# Patient Record
Sex: MALE | Race: WHITE | Employment: UNEMPLOYED | RURAL
[De-identification: names, ages, dates, MRNs, and addresses within clinical notes are randomized per-mention and may not be internally consistent; named-entity substitution may affect disease eponyms.]

---

## 2021-07-24 ENCOUNTER — APPOINTMENT (OUTPATIENT)
Dept: CT IMAGING | Facility: HOSPITAL | Age: 36
End: 2021-07-24
Payer: COMMERCIAL

## 2021-07-24 ENCOUNTER — HOSPITAL ENCOUNTER (EMERGENCY)
Facility: HOSPITAL | Age: 36
Discharge: ANOTHER HOSPITAL | End: 2021-07-25
Attending: EMERGENCY MEDICINE
Payer: COMMERCIAL

## 2021-07-24 ENCOUNTER — APPOINTMENT (OUTPATIENT)
Dept: GENERAL RADIOLOGY | Facility: HOSPITAL | Age: 36
End: 2021-07-24
Payer: COMMERCIAL

## 2021-07-24 VITALS
SYSTOLIC BLOOD PRESSURE: 120 MMHG | WEIGHT: 134 LBS | HEART RATE: 105 BPM | DIASTOLIC BLOOD PRESSURE: 84 MMHG | RESPIRATION RATE: 24 BRPM | OXYGEN SATURATION: 97 % | TEMPERATURE: 99.8 F | BODY MASS INDEX: 20.31 KG/M2 | HEIGHT: 68 IN

## 2021-07-24 DIAGNOSIS — D72.829 LEUKOCYTOSIS, UNSPECIFIED TYPE: ICD-10-CM

## 2021-07-24 DIAGNOSIS — F19.10 IV DRUG ABUSE (HCC): ICD-10-CM

## 2021-07-24 DIAGNOSIS — R07.89 CHEST WALL PAIN: ICD-10-CM

## 2021-07-24 DIAGNOSIS — R00.0 TACHYCARDIA: ICD-10-CM

## 2021-07-24 DIAGNOSIS — L03.113 CELLULITIS OF RIGHT ELBOW: ICD-10-CM

## 2021-07-24 DIAGNOSIS — I26.90 ACUTE SEPTIC PULMONARY EMBOLISM, UNSPECIFIED WHETHER ACUTE COR PULMONALE PRESENT (HCC): Primary | ICD-10-CM

## 2021-07-24 DIAGNOSIS — R06.00 DYSPNEA, UNSPECIFIED TYPE: ICD-10-CM

## 2021-07-24 DIAGNOSIS — R01.1 SYSTOLIC MURMUR: ICD-10-CM

## 2021-07-24 DIAGNOSIS — A41.9 SEPTICEMIA (HCC): ICD-10-CM

## 2021-07-24 LAB
A/G RATIO: 0.5 (ref 0.8–2)
ALBUMIN SERPL-MCNC: 2.2 G/DL (ref 3.4–4.8)
ALP BLD-CCNC: 191 U/L (ref 25–100)
ALT SERPL-CCNC: 46 U/L (ref 4–36)
ANION GAP SERPL CALCULATED.3IONS-SCNC: 12 MMOL/L (ref 3–16)
AST SERPL-CCNC: 72 U/L (ref 8–33)
BASOPHILS ABSOLUTE: 0.1 K/UL (ref 0–0.1)
BASOPHILS RELATIVE PERCENT: 0.4 %
BILIRUB SERPL-MCNC: 0.5 MG/DL (ref 0.3–1.2)
BUN BLDV-MCNC: 36 MG/DL (ref 6–20)
CALCIUM SERPL-MCNC: 8.2 MG/DL (ref 8.5–10.5)
CHLORIDE BLD-SCNC: 99 MMOL/L (ref 98–107)
CO2: 20 MMOL/L (ref 20–30)
CREAT SERPL-MCNC: 1.4 MG/DL (ref 0.4–1.2)
EOSINOPHILS ABSOLUTE: 0 K/UL (ref 0–0.4)
EOSINOPHILS RELATIVE PERCENT: 0.1 %
GFR AFRICAN AMERICAN: >59
GFR NON-AFRICAN AMERICAN: 58
GLOBULIN: 4.2 G/DL
GLUCOSE BLD-MCNC: 105 MG/DL (ref 74–106)
HCT VFR BLD CALC: 31.5 % (ref 40–54)
HEMOGLOBIN: 10.4 G/DL (ref 13–18)
IMMATURE GRANULOCYTES #: 0.3 K/UL
IMMATURE GRANULOCYTES %: 2 % (ref 0–5)
LACTIC ACID: 1.8 MMOL/L (ref 0.4–2)
LYMPHOCYTES ABSOLUTE: 1.2 K/UL (ref 1.5–4)
LYMPHOCYTES RELATIVE PERCENT: 8.4 %
MCH RBC QN AUTO: 27.1 PG (ref 27–32)
MCHC RBC AUTO-ENTMCNC: 33 G/DL (ref 31–35)
MCV RBC AUTO: 82 FL (ref 80–100)
MONOCYTES ABSOLUTE: 0.6 K/UL (ref 0.2–0.8)
MONOCYTES RELATIVE PERCENT: 4.4 %
NEUTROPHILS ABSOLUTE: 12 K/UL (ref 2–7.5)
NEUTROPHILS RELATIVE PERCENT: 84.7 %
PDW BLD-RTO: 13.7 % (ref 11–16)
PLATELET # BLD: 211 K/UL (ref 150–400)
PMV BLD AUTO: 12 FL (ref 6–10)
POTASSIUM REFLEX MAGNESIUM: 3.8 MMOL/L (ref 3.4–5.1)
RBC # BLD: 3.84 M/UL (ref 4.5–6)
SARS-COV-2, NAAT: NOT DETECTED
SODIUM BLD-SCNC: 131 MMOL/L (ref 136–145)
TOTAL PROTEIN: 6.4 G/DL (ref 6.4–8.3)
TROPONIN: <0.3 NG/ML
WBC # BLD: 14.2 K/UL (ref 4–11)

## 2021-07-24 PROCEDURE — 6360000002 HC RX W HCPCS: Performed by: EMERGENCY MEDICINE

## 2021-07-24 PROCEDURE — 74177 CT ABD & PELVIS W/CONTRAST: CPT

## 2021-07-24 PROCEDURE — 87635 SARS-COV-2 COVID-19 AMP PRB: CPT

## 2021-07-24 PROCEDURE — 83605 ASSAY OF LACTIC ACID: CPT

## 2021-07-24 PROCEDURE — 80053 COMPREHEN METABOLIC PANEL: CPT

## 2021-07-24 PROCEDURE — 87150 DNA/RNA AMPLIFIED PROBE: CPT

## 2021-07-24 PROCEDURE — 6360000004 HC RX CONTRAST MEDICATION: Performed by: EMERGENCY MEDICINE

## 2021-07-24 PROCEDURE — 71045 X-RAY EXAM CHEST 1 VIEW: CPT

## 2021-07-24 PROCEDURE — 36415 COLL VENOUS BLD VENIPUNCTURE: CPT

## 2021-07-24 PROCEDURE — 87186 SC STD MICRODIL/AGAR DIL: CPT

## 2021-07-24 PROCEDURE — 85025 COMPLETE CBC W/AUTO DIFF WBC: CPT

## 2021-07-24 PROCEDURE — 87040 BLOOD CULTURE FOR BACTERIA: CPT

## 2021-07-24 PROCEDURE — 84484 ASSAY OF TROPONIN QUANT: CPT

## 2021-07-24 PROCEDURE — 99283 EMERGENCY DEPT VISIT LOW MDM: CPT

## 2021-07-24 PROCEDURE — 71275 CT ANGIOGRAPHY CHEST: CPT

## 2021-07-24 PROCEDURE — 96365 THER/PROPH/DIAG IV INF INIT: CPT

## 2021-07-24 PROCEDURE — 99284 EMERGENCY DEPT VISIT MOD MDM: CPT

## 2021-07-24 PROCEDURE — 2580000003 HC RX 258: Performed by: EMERGENCY MEDICINE

## 2021-07-24 RX ORDER — 0.9 % SODIUM CHLORIDE 0.9 %
1000 INTRAVENOUS SOLUTION INTRAVENOUS ONCE
Status: COMPLETED | OUTPATIENT
Start: 2021-07-24 | End: 2021-07-25

## 2021-07-24 RX ADMIN — IOPAMIDOL 100 ML: 755 INJECTION, SOLUTION INTRAVENOUS at 22:15

## 2021-07-24 RX ADMIN — PIPERACILLIN AND TAZOBACTAM 4500 MG: 4; .5 INJECTION, POWDER, FOR SOLUTION INTRAVENOUS at 21:42

## 2021-07-24 RX ADMIN — SODIUM CHLORIDE 1000 ML: 9 INJECTION, SOLUTION INTRAVENOUS at 21:41

## 2021-07-24 ASSESSMENT — PAIN DESCRIPTION - PAIN TYPE: TYPE: ACUTE PAIN

## 2021-07-24 ASSESSMENT — PAIN SCALES - GENERAL: PAINLEVEL_OUTOF10: 10

## 2021-07-24 ASSESSMENT — PAIN DESCRIPTION - LOCATION: LOCATION: CHEST

## 2021-07-24 ASSESSMENT — PAIN DESCRIPTION - FREQUENCY: FREQUENCY: CONTINUOUS

## 2021-07-24 ASSESSMENT — PAIN DESCRIPTION - DESCRIPTORS: DESCRIPTORS: HEAVINESS

## 2021-07-24 ASSESSMENT — PAIN DESCRIPTION - ORIENTATION: ORIENTATION: LEFT

## 2021-07-25 PROCEDURE — 2580000003 HC RX 258: Performed by: EMERGENCY MEDICINE

## 2021-07-25 RX ORDER — 0.9 % SODIUM CHLORIDE 0.9 %
1000 INTRAVENOUS SOLUTION INTRAVENOUS ONCE
Status: COMPLETED | OUTPATIENT
Start: 2021-07-25 | End: 2021-07-25

## 2021-07-25 RX ADMIN — SODIUM CHLORIDE 1000 ML: 9 INJECTION, SOLUTION INTRAVENOUS at 00:22

## 2021-07-25 NOTE — ED NOTES
Report given to [facility] at this time.      Carlyon Denver, RN  07/25/21 9494

## 2021-07-25 NOTE — ED PROVIDER NOTES
[address] ENCOUNTER      Pt Name: Letty Leventhal  MRN: [id number]  YOB: 1985  Date of evaluation: 7/24/2021  Provider: Lam Zamorano MD    [address] [place] [address]       Chief Complaint   Patient presents with    Shortness of Breath    Chest Pain         HISTORY OF PRESENT ILLNESS  (Location/Symptom, Timing/Onset, Context/Setting, Quality, Duration, Modifying Factors, Severity.)   Letty Leventhal is a 37 y.o. male who presents to the emergency department senior living with multiple complaints. Patient is known inmate who is being treated by medical staff at the senior living secondary to concern of cellulitis of the right elbow. Patient was being treated with vancomycin 1 g IV for the last 2 days. Staff member state that patient had been complaining of nonproductive cough along with chest wall pain and shortness of breath. Staff states that patient's oxygen saturation decreased prior to arrival and was concerned about sepsis so he was sent to the emergency department for further evaluation. Patient states that he believes that his infection of his right arm, shortness of breath, chest wall pain and cough that is nonproductive as result of using recent IV drug use. Patient states that approximately 2 to 3 weeks prior he had been shooting up Suboxone and had been sharing the needle with others and after the other individual use the needle and that he used that he did not clean it or use a sterile needle. Patient's O2 saturation upon arrival was 97%. Patient's temperature on arrival is 99.8. Patient states he does not currently smoke. Staff states that his symptoms were getting progressively worse even though receiving IV vancomycin so he was sent to the hospital for further evaluation definitive care. Patient is resting comfortably in the room conversing in full sentences no acute distress      Nursing notes were reviewed.     REVIEW OFSYSTEMS    (2-9 systems for level 4, 10 or more for level 5)   ROS:  General: Intermittent fever and body aches  Cardiovascular: Chest wall pain with cough  Respiratory: Intermittent shortness of breath  Gastrointestinal:  No pain, no nausea, no vomiting, no diarrhea  Musculoskeletal:  No muscle pain, no joint pain. Right arm cellulitis  Skin: Right arm cellulitis  Neurologic:  No speech problems, no headache, no extremity weakness  Psychiatric:  No anxiety  Genitourinary:  No dysuria, no hematuria    Except as noted above the remainder of the review of systems was reviewed and negative. PAST MEDICAL HISTORY     Past Medical History:   Diagnosis Date    Hepatitis C          SURGICAL HISTORY     History reviewed. No pertinent surgical history. CURRENT MEDICATIONS       Previous Medications    No medications on file       ALLERGIES     Trileptal [oxcarbazepine]    FAMILY HISTORY     History reviewed. No pertinent family history. SOCIAL HISTORY       Social History     Socioeconomic History    Marital status: Unknown     Spouse name: None    Number of children: None    Years of education: None    Highest education level: None   Occupational History    None   Tobacco Use    Smoking status: Former Smoker    Smokeless tobacco: Never Used   Substance and Sexual Activity    Alcohol use: Not Currently    Drug use: None    Sexual activity: None   Other Topics Concern    None   Social History Narrative    None     Social Determinants of Health     Financial Resource Strain:     Difficulty of Paying Living Expenses:    Food Insecurity:     Worried About Running Out of Food in the Last Year:     Ran Out of Food in the Last Year:    Transportation Needs:     Lack of Transportation (Medical):      Lack of Transportation (Non-Medical):    Physical Activity:     Days of Exercise per Week:     Minutes of Exercise per Session:    Stress:     Feeling of Stress :    Social Connections:     Frequency of Communication with Friends and Family:     Frequency of Social Gatherings with Friends and Family:     Attends Muslim Services:     Active Member of Clubs or Organizations:     Attends Club or Organization Meetings:     Marital Status:    Intimate Partner Violence:     Fear of Current or Ex-Partner:     Emotionally Abused:     Physically Abused:     Sexually Abused:          PHYSICAL EXAM    (up to 7 for level 4, 8 or more for level 5)     ED Triage Vitals   BP Temp Temp src Pulse Resp SpO2 Height Weight   07/24/21 2045 -- -- 07/24/21 2030 -- 07/24/21 2030 -- --   117/82   117  97 %         Physical Exam  General :Patient is awake, alert, oriented, in no acute distress, nontoxic appearing  HEENT: Pupils are equally round and reactive to light, EOMI, conjunctivae clear. Oral mucosa is moist, no exudate. Uvula is midline  Neck: Neck is supple, full range of motion, trachea midline  Cardiac: Heart regular rate, rhythm, 1/6 systolic murmur, rubs, or gallops  Lungs: Decreased breath sounds bilateral bases with normal respiratory rate. Bilateral rhonchi  Chest wall: There is mild reproducible chest wall discomfort on palpation that recreates his symptoms of breath patient in  Abdomen: Abdomen is soft, nontender, nondistended. There is no firm or pulsatile masses, no rebound rigidity or guarding. Musculoskeletal: 5 out of 5 strength in all 4 extremities. No focal muscle deficits are appreciated  Right elbow= localized cellulitis right elbow. No fluctuance or drainage present  Neuro: Motor intact, sensory intact, level of consciousness is normal, cerebellar function is normal, reflexes are grossly normal. No evidence of incontinence or loss of bowel or bladder function, no saddle anesthesia noted   Dermatology: Right elbow cellulitis  Psych: Mentation is grossly normal, cognition is grossly normal. Affect is appropriate.       DIAGNOSTIC RESULTS     EKG: All EKG's are interpreted by the Emergency Department Physician who either signs or Co-signs this chart in the absenceof a cardiologist.    The EKG interpreted by me shows sinus tachycardia rate of 116 per EDMD    RADIOLOGY:   Non-plain film images such as CT, Ultrasound and MRI are read by the radiologist. Plain radiographic images are visualized and preliminarily interpreted by the emergency physician with the below findings:      ? Radiologist's Report Reviewed:  CT ABDOMEN PELVIS W IV CONTRAST Additional Contrast? None   Final Result      Chest:   1. Multiple scattered cavitary nodules compatible with septic emboli. 2. No filling defects within the pulmonary arteries to indicate pulmonary emboli. Abdomen/pelvis:   1. Moderate splenomegaly with multiple small splenic infarcts related to septic emboli. CTA CHEST W CONTRAST   Final Result      Chest:   1. Multiple scattered cavitary nodules compatible with septic emboli. 2. No filling defects within the pulmonary arteries to indicate pulmonary emboli. Abdomen/pelvis:   1. Moderate splenomegaly with multiple small splenic infarcts related to septic emboli. XR CHEST PORTABLE   Final Result      Bibasal airspace disease with a cavitary nodule at the left lung base. This is likely infectious/inflammatory process to include the possibility of septic emboli.             ED BEDSIDE ULTRASOUND:   Performed by ED Physician - none    LABS:    I have reviewed and interpreted all of the currently available lab results from this visit (ifapplicable):  Results for orders placed or performed during the hospital encounter of 07/24/21   COVID-19, Rapid   Result Value Ref Range    SARS-CoV-2, NAAT Not Detected Not Detected   CBC Auto Differential   Result Value Ref Range    WBC 14.2 (H) 4.0 - 11.0 K/uL    RBC 3.84 (L) 4.50 - 6.00 M/uL    Hemoglobin 10.4 (L) 13.0 - 18.0 g/dL    Hematocrit 31.5 (L) 40.0 - 54.0 %    MCV 82.0 80.0 - 100.0 fL    MCH 27.1 27.0 - 32.0 pg    MCHC 33.0 31.0 - 35.0 g/dL    RDW 13.7 11.0 - 16.0 %    Platelets 275 745 - 616 K/uL    MPV 12.0 (H) 6.0 - 10.0 fL    Neutrophils % 84.7 %    Immature Granulocytes % 2.0 0.0 - 5.0 %    Lymphocytes % 8.4 %    Monocytes % 4.4 %    Eosinophils % 0.1 %    Basophils % 0.4 %    Neutrophils Absolute 12.0 (H) 2.0 - 7.5 K/uL    Immature Granulocytes # 0.3 K/uL    Lymphocytes Absolute 1.2 (L) 1.5 - 4.0 K/uL    Monocytes Absolute 0.6 0.2 - 0.8 K/uL    Eosinophils Absolute 0.0 0.0 - 0.4 K/uL    Basophils Absolute 0.1 0.0 - 0.1 K/uL   Comprehensive Metabolic Panel w/ Reflex to MG   Result Value Ref Range    Sodium 131 (L) 136 - 145 mmol/L    Potassium reflex Magnesium 3.8 3.4 - 5.1 mmol/L    Chloride 99 98 - 107 mmol/L    CO2 20 20 - 30 mmol/L    Anion Gap 12 3 - 16    Glucose 105 74 - 106 mg/dL    BUN 36 (H) 6 - 20 mg/dL    CREATININE 1.4 (H) 0.4 - 1.2 mg/dL    GFR Non-African American 58 (L) >59    GFR African American >59 >59    Calcium 8.2 (L) 8.5 - 10.5 mg/dL    Total Protein 6.4 6.4 - 8.3 g/dL    Albumin 2.2 (L) 3.4 - 4.8 g/dL    Albumin/Globulin Ratio 0.5 (L) 0.8 - 2.0    Total Bilirubin 0.5 0.3 - 1.2 mg/dL    Alkaline Phosphatase 191 (H) 25 - 100 U/L    ALT 46 (H) 4 - 36 U/L    AST 72 (H) 8 - 33 U/L    Globulin 4.2 g/dL   Troponin   Result Value Ref Range    Troponin <0.30 <0.30 ng/mL   Lactic Acid, Plasma   Result Value Ref Range    Lactic Acid 1.8 0.4 - 2.0 mmol/L        All other labs were within normal range or not returned as of this dictation.     EMERGENCY DEPARTMENT COURSE and DIFFERENTIAL DIAGNOSIS/MDM:   Vitals:    Vitals:    07/24/21 2245 07/24/21 2300 07/24/21 2315 07/24/21 2330   BP: 115/80 114/78 120/83 127/86   Pulse: 109 109 110 105   Resp:    22   Temp:       TempSrc:       SpO2: 96% 96% 97% 97%   Weight:       Height:           MEDICATIONS ADMINISTERED IN ED:  Medications   piperacillin-tazobactam (ZOSYN) 4,500 mg in dextrose 5 % 100 mL IVPB (mini-bag) (0 mg Intravenous Stopped 7/24/21 2308)   0.9 % sodium chloride bolus (1,000 mLs Intravenous New Bag 7/24/21 2141)   iopamidol (ISOVUE-370) 76 % injection 100 mL (100 mLs Intravenous Given 7/24/21 1935)       Patient was placed examination room in which time after exam was performed EKG was obtained which showed sinus tachycardia rate of 116 per EDMD.  IV was obtained and labs were drawn. Chest x-ray was obtained which impression per radiology revealed bibasilar airspace disease with cavitary nodule at the left lung base concerning for infectious process including possibility of septic emboli. CTA of chest was obtained. Covid test was negative. Patient had already received 1 g of vancomycin IV today [facility] as documented by staff. Patient was given Zosyn 4.5 g IV. Patient was given 1 L IV normal saline bolus. Review of patient's labs revealed lactic acid of 1.8. Blood cultures x2 was obtained. Troponin was negative. Creatinine was 1.4. White count was 14.2 thousand. Glucose was 105. COVID-19 test was negative. CTA of the chest revealed multiple scattered cavitary nodules compatible with septic emboli. No pulmonary embolus noted. CT abdomen and pelvis revealed moderate splenomegaly with small splenic infarcts related to septic emboli. Results were reviewed with the patient and security guards present and the need to transfer the patient to a higher level of care secondary concerns of septic pulmonary emboli, sepsis, septic splenic emboli, need for cardiac echo, pulmonology. Infectious disease. Dr. Celso Rodriguez with [facility] was consulted by phone who graciously excepted the patient for transfer to [facility] emergency department. Plan was discussed with patient who is appreciative the care agrees with the plan above  The patient will follow-up with their PCP in 1-2 days for reevaluation. If the patient or family members have anyfurther concerns or any worsening symptoms they will return to the ED for reevaluation.          CONSULTS:Dr Celso Rodriguez with [facility] transfer center was consulted by phone who graciously excepted patient in transfer to [facility] emergency department for further evaluation definitive care      PROCEDURES:      CRITICAL CARE TIME    Total Critical Care time was 30 minutes, excluding separately reportable procedures. There was a high probability of clinically significant/life threatening deterioration in the patient's condition which required my urgent intervention secondary to concerns of sepsis, septic pulmonary emboli bilateral, septic splenic emboli, concerns of endocarditis and further deterioration. FINAL IMPRESSION      1. Acute septic pulmonary embolism, unspecified whether acute cor pulmonale present (HCC) Stable   2. Dyspnea, unspecified type Stable   3. Chest wall pain Stable   4. IV drug abuse ([facility]) Stable   5. Systolic murmur Stable   6. Cellulitis of right elbow Stable   7. Leukocytosis, unspecified type Stable   8. Septicemia ([facility]) Stable   9. Tachycardia Stable         DISPOSITION/PLAN   DISPOSITION Decision To Transfer 07/24/2021 11:31:01 PM      PATIENT REFERRED TO:  No follow-up provider specified. DISCHARGE MEDICATIONS:  New Prescriptions    No medications on file       Comment: Please note this report has been produced using speech recognition software and may contain errorsrelated to that system including errors in grammar, punctuation, and spelling, as well as words and phrases that may be inappropriate. If there are any questions or concerns please feel free to contact the dictating providerfor clarification.     Abdullahi Salgado MD  Attending Emergency Physician              Abdullahi Salgado MD  07/24/21 8812

## 2021-07-25 NOTE — ED NOTES
Report given to Ervin Baumann RN at [address] [place] [address].       Anna Eugene RN  07/24/21 2740

## 2021-07-25 NOTE — ED NOTES
Administered Isovue 370 per radiology department protocols via hep lock established in ER. Pt tolerated the exam without any obvious difficulties and was d/c'd back to ER with no apparent reaction to the contrast.  ER RN notified of contrast administration.

## 2021-07-25 NOTE — ED NOTES
Pt in [facility] at [facility]       Raman Fisher, LEXI  07/25/21 3327

## 2021-07-25 NOTE — ED TRIAGE NOTES
PT STATED HE WAS HAVING TROUBLE BREATHING, COULDNT GET UP OUT OF BED THEN WENT TO CHEST PAIN, ACHING AND FELT LIKE HE WAS SWELLING. NO SWELLING AT THIS TIME. /

## 2021-07-27 LAB — REPORT: NORMAL

## 2021-07-27 NOTE — ED NOTES
Reported positive blood culture of Staph Aureus to Rufino Pickens RN.       Dalton Mary RN  07/27/21 2952

## 2021-07-28 LAB
BLOOD CULTURE, ROUTINE: ABNORMAL
BLOOD CULTURE, ROUTINE: ABNORMAL
ORGANISM: ABNORMAL
ORGANISM: ABNORMAL

## 2021-07-29 LAB — CULTURE, BLOOD 2: NORMAL

## 2021-08-06 ENCOUNTER — HOSPITAL ENCOUNTER (EMERGENCY)
Facility: HOSPITAL | Age: 36
Discharge: HOME | End: 2021-08-07
Attending: FAMILY MEDICINE
Payer: COMMERCIAL

## 2021-08-06 ENCOUNTER — APPOINTMENT (OUTPATIENT)
Dept: GENERAL RADIOLOGY | Facility: HOSPITAL | Age: 36
End: 2021-08-06
Payer: COMMERCIAL

## 2021-08-06 DIAGNOSIS — R60.1 ANASARCA: Primary | ICD-10-CM

## 2021-08-06 LAB
A/G RATIO: 0.4 (ref 0.8–2)
ALBUMIN SERPL-MCNC: 2.1 G/DL (ref 3.4–4.8)
ALP BLD-CCNC: 72 U/L (ref 25–100)
ALT SERPL-CCNC: 13 U/L (ref 4–36)
ANION GAP SERPL CALCULATED.3IONS-SCNC: 9 MMOL/L (ref 3–16)
AST SERPL-CCNC: 17 U/L (ref 8–33)
BASOPHILS ABSOLUTE: 0.1 K/UL (ref 0–0.1)
BASOPHILS RELATIVE PERCENT: 0.9 %
BILIRUB SERPL-MCNC: 0.3 MG/DL (ref 0.3–1.2)
BILIRUBIN URINE: NEGATIVE
BLOOD, URINE: ABNORMAL
BUN BLDV-MCNC: 16 MG/DL (ref 6–20)
CALCIUM SERPL-MCNC: 8.4 MG/DL (ref 8.5–10.5)
CHLORIDE BLD-SCNC: 107 MMOL/L (ref 98–107)
CLARITY: CLEAR
CO2: 20 MMOL/L (ref 20–30)
COLOR: ABNORMAL
CREAT SERPL-MCNC: 1 MG/DL (ref 0.4–1.2)
EOSINOPHILS ABSOLUTE: 0 K/UL (ref 0–0.4)
EOSINOPHILS RELATIVE PERCENT: 0 %
EPITHELIAL CELLS, UA: NORMAL /HPF (ref 0–5)
GFR AFRICAN AMERICAN: >59
GFR NON-AFRICAN AMERICAN: >59
GLOBULIN: 5.1 G/DL
GLUCOSE BLD-MCNC: 98 MG/DL (ref 74–106)
GLUCOSE URINE: NEGATIVE MG/DL
HCT VFR BLD CALC: 26.3 % (ref 40–54)
HEMOGLOBIN: 8.3 G/DL (ref 13–18)
IMMATURE GRANULOCYTES #: 0.1 K/UL
IMMATURE GRANULOCYTES %: 0.5 % (ref 0–5)
KETONES, URINE: NEGATIVE MG/DL
LEUKOCYTE ESTERASE, URINE: NEGATIVE
LYMPHOCYTES ABSOLUTE: 1.7 K/UL (ref 1.5–4)
LYMPHOCYTES RELATIVE PERCENT: 13.4 %
MCH RBC QN AUTO: 26.9 PG (ref 27–32)
MCHC RBC AUTO-ENTMCNC: 31.6 G/DL (ref 31–35)
MCV RBC AUTO: 85.4 FL (ref 80–100)
MICROSCOPIC EXAMINATION: YES
MONOCYTES ABSOLUTE: 0.7 K/UL (ref 0.2–0.8)
MONOCYTES RELATIVE PERCENT: 5.6 %
NEUTROPHILS ABSOLUTE: 9.8 K/UL (ref 2–7.5)
NEUTROPHILS RELATIVE PERCENT: 79.6 %
NITRITE, URINE: NEGATIVE
PDW BLD-RTO: 15.5 % (ref 11–16)
PH UA: 6.5 (ref 5–8)
PLATELET # BLD: 990 K/UL (ref 150–400)
PMV BLD AUTO: 8.9 FL (ref 6–10)
POTASSIUM SERPL-SCNC: 3.8 MMOL/L (ref 3.4–5.1)
PRO-BNP: 2508 PG/ML (ref 0–900)
PROTEIN UA: NEGATIVE MG/DL
RBC # BLD: 3.08 M/UL (ref 4.5–6)
RBC UA: NORMAL /HPF (ref 0–4)
SODIUM BLD-SCNC: 136 MMOL/L (ref 136–145)
SPECIFIC GRAVITY UA: 1.02 (ref 1–1.03)
TOTAL PROTEIN: 7.2 G/DL (ref 6.4–8.3)
TROPONIN: <0.3 NG/ML
URINE REFLEX TO CULTURE: ABNORMAL
URINE TYPE: ABNORMAL
UROBILINOGEN, URINE: 0.2 E.U./DL
WBC # BLD: 12.3 K/UL (ref 4–11)
WBC UA: NORMAL /HPF (ref 0–5)

## 2021-08-06 PROCEDURE — 96376 TX/PRO/DX INJ SAME DRUG ADON: CPT

## 2021-08-06 PROCEDURE — 81001 URINALYSIS AUTO W/SCOPE: CPT

## 2021-08-06 PROCEDURE — 83880 ASSAY OF NATRIURETIC PEPTIDE: CPT

## 2021-08-06 PROCEDURE — 80053 COMPREHEN METABOLIC PANEL: CPT

## 2021-08-06 PROCEDURE — 84484 ASSAY OF TROPONIN QUANT: CPT

## 2021-08-06 PROCEDURE — 6360000002 HC RX W HCPCS: Performed by: FAMILY MEDICINE

## 2021-08-06 PROCEDURE — 71045 X-RAY EXAM CHEST 1 VIEW: CPT

## 2021-08-06 PROCEDURE — 36415 COLL VENOUS BLD VENIPUNCTURE: CPT

## 2021-08-06 PROCEDURE — 99283 EMERGENCY DEPT VISIT LOW MDM: CPT

## 2021-08-06 PROCEDURE — 85025 COMPLETE CBC W/AUTO DIFF WBC: CPT

## 2021-08-06 PROCEDURE — 96374 THER/PROPH/DIAG INJ IV PUSH: CPT

## 2021-08-06 RX ORDER — FUROSEMIDE 10 MG/ML
40 INJECTION INTRAMUSCULAR; INTRAVENOUS ONCE
Status: COMPLETED | OUTPATIENT
Start: 2021-08-06 | End: 2021-08-06

## 2021-08-06 RX ADMIN — FUROSEMIDE 40 MG: 10 INJECTION, SOLUTION INTRAMUSCULAR; INTRAVENOUS at 21:16

## 2021-08-06 RX ADMIN — FUROSEMIDE 40 MG: 10 INJECTION, SOLUTION INTRAMUSCULAR; INTRAVENOUS at 19:51

## 2021-08-06 ASSESSMENT — PAIN DESCRIPTION - PAIN TYPE: TYPE: ACUTE PAIN

## 2021-08-06 ASSESSMENT — PAIN SCALES - GENERAL: PAINLEVEL_OUTOF10: 10

## 2021-08-06 ASSESSMENT — PAIN DESCRIPTION - LOCATION: LOCATION: GENERALIZED

## 2021-08-06 NOTE — ED TRIAGE NOTES
Pt was discharged from [facility] today. Pt states that he has had an increase in swelling since being back to facility. Pt swollen in arms, legs, and groin.

## 2021-08-06 NOTE — ED PROVIDER NOTES
[address]  eMERGENCY dEPARTMENT eNCOUnter      Pt Name: Jarrett Keen  MRN: [id number]  [place] 1985  Date of evaluation: 8/6/2021  Provider: Dewayne Gayle DO    CHIEF COMPLAINT       Chief Complaint   Patient presents with    Leg Swelling    Arm Swelling    Groin Swelling         HISTORY OF PRESENT ILLNESS   (Location/Symptom, Timing/Onset, Context/Setting, Quality, Duration, Modifying Factors, Severity)  Note limiting factors. Jarrett Keen is a 37 y.o. male who presents to the emergency department for having swelling to his arms, legs, groin. Pt was recently released from [facility] today around 2 pm. He noted that he had the swelling before he left [facility] but they felt like it would be ok to D/C. He got back to [facility] and they noted that he was swollen so they sent him back out to be evaluated but wasn't taken back to [facility]. He came here for eval. Pt without any shortness of breath. Sat 97%. No cough, no fever. Still has PICC line and getting IV antibiotics. Pt with history of IVDA, using Suboxone in long-term, pt ended up with cellulitis of right arm and shortness of breath, having septic emboli on CT of chest here. Pt had echo, multiple procedures done at [facility]. They felt like he could be sent home with antibiotics, other medications and Lasix BID. Pt concerned cause he feels more swollen that what he was when he left. Nursing Notes were reviewed. REVIEW OF SYSTEMS    (2-9 systems for level 4, 10 or more forlevel 5)     Review of Systems   Constitutional:        Generalized swelling all over   Cardiovascular: Positive for leg swelling. All other systems reviewed and are negative. PAST MEDICAL HISTORY     Past Medical History:   Diagnosis Date    Hepatitis C          SURGICAL HISTORY     History reviewed. No pertinent surgical history.       CURRENT MEDICATIONS       Previous Medications    No medications on file       ALLERGIES Trileptal [oxcarbazepine]    FAMILY HISTORY     History reviewed. No pertinent family history. SOCIAL HISTORY       Social History     Socioeconomic History    Marital status: Unknown     Spouse name: None    Number of children: None    Years of education: None    Highest education level: None   Occupational History    None   Tobacco Use    Smoking status: Former Smoker    Smokeless tobacco: Never Used   Substance and Sexual Activity    Alcohol use: Not Currently    Drug use: None    Sexual activity: None   Other Topics Concern    None   Social History Narrative    None     Social Determinants of Health     Financial Resource Strain:     Difficulty of Paying Living Expenses:    Food Insecurity:     Worried About Running Out of Food in the Last Year:     Ran Out of Food in the Last Year:    Transportation Needs:     Lack of Transportation (Medical):  Lack of Transportation (Non-Medical):    Physical Activity:     Days of Exercise per Week:     Minutes of Exercise per Session:    Stress:     Feeling of Stress :    Social Connections:     Frequency of Communication with Friends and Family:     Frequency of Social Gatherings with Friends and Family:     Attends Taoism Services:     Active Member of Clubs or Organizations:     Attends Club or Organization Meetings:     Marital Status:    Intimate Partner Violence:     Fear of Current or Ex-Partner:     Emotionally Abused:     Physically Abused:     Sexually Abused:        SCREENINGS             PHYSICAL EXAM    (up to 7 for level 4, 8 or more for level 5)     ED Triage Vitals   BP Temp Temp Source Pulse Resp SpO2 Height Weight   08/06/21 1800 08/06/21 1803 08/06/21 1803 08/06/21 1800 08/06/21 1800 08/06/21 1800 08/06/21 1800 08/06/21 1800   (!) 143/113 99.2 °F (37.3 °C) Oral 101 18 98 % 5' 8\" (1.727 m) 134 lb (60.8 kg)       Physical Exam  Vitals and nursing note reviewed.    Constitutional:       General: He is not in acute redemonstrated.                ED BEDSIDE ULTRASOUND:   Performed by ED Physician - none    LABS:  Labs Reviewed   BRAIN NATRIURETIC PEPTIDE - Abnormal; Notable for the following components:       Result Value    Pro-BNP 2,508 (*)     All other components within normal limits    Narrative:     Performed at:  [address] [place] [address] Laboratory  [address] [place], [place] [address], [address]   Phone [phone]   COMPREHENSIVE METABOLIC PANEL - Abnormal; Notable for the following components:    Calcium 8.4 (*)     Albumin 2.1 (*)     Albumin/Globulin Ratio 0.4 (*)     All other components within normal limits    Narrative:     Performed at:  [address] [place] [address] Laboratory  [address] [place], [place] [address], [address]   Phone [phone]   CBC WITH AUTO DIFFERENTIAL - Abnormal; Notable for the following components:    WBC 12.3 (*)     RBC 3.08 (*)     Hemoglobin 8.3 (*)     Hematocrit 26.3 (*)     MCH 26.9 (*)     Platelets 924 (*)     Neutrophils Absolute 9.8 (*)     All other components within normal limits    Narrative:     Performed at:  [address] [place] [address] Laboratory  [address] [place], [place] [address], [address]   Phone [phone]   URINE  [address] - Abnormal; Notable for the following components:    Blood, Urine TRACE-INTACT (*)     All other components within normal limits    Narrative:     Performed at:  [address] [place] [address] Laboratory  [address] [place], [place] [address], [address]   Phone [phone]   TROPONIN    Narrative:     Performed at:  [address] [place] [address] Laboratory  [address] [place], [place] [address], [address]   Phone [phone]   MICROSCOPIC URINALYSIS    Narrative:     Performed at:  [address] [place] [address] Laboratory  [address] [place], [place] [address], [address]   Phone [phone]       All other labs were within normal range or not returned as of this dictation. EMERGENCY DEPARTMENT COURSE and DIFFERENTIAL DIAGNOSIS/MDM:   Vitals:    Vitals:    08/06/21 2130 08/06/21 2145 08/06/21 2200 08/06/21 2215   BP: (!) 143/105 (!) 143/106 (!) 146/103 (!) 152/100   Pulse:       Resp:       Temp:       TempSrc:       SpO2:       Weight:       Height:               CRITICAL CARE TIME   Total Critical Care time was 0 minutes, excluding separatelyreportable procedures. There was a high probability ofclinically significant/life threatening deterioration in the patient's condition which required my urgent intervention. CONSULTS:  Kevin Meier and spoke with Dr. Kei Brown who didn't feel based on review of charting,m today's discharge that he would need readmitted  Called local Dr. Alisson Rodney and discussed case and he felt the anasarca is normal and can last up to 6 weeks. Pt would be best served to go back to senior living and continue lasix but may go up to 80 mg in morning and 40 mg at night and recheck renal function in 4 days    PROCEDURES:  None    PROGRESS NOTES:    Reviewed his recent ER visit and all labs. Looks like the patient had endocarditis, multiple procedures, getting IV antibiotics. Pt does have some scrotal edema and penile edema as well as generalized edema. No CHF symptoms, SOA, chest pain, no fever. Will try to help diurese the patient while here and call to discuss case with [facility]. Gave IV lasix 40  Mg and then gave another 40 mg. Pt diuresed about 3 jugs full of ER urination measurement of 2,500 cc total so far. Discussed with pt and then called the nurse there stating that he will be sent back under condition of changing medications of Lasix and monitoring of patient's swelling and his respiratory status and temperature. Pt will need BNP and BMP checked in 4 days    FINAL IMPRESSION      1. Anasarca New Problem         DISPOSITION/PLAN   DISPOSITION Decision To Discharge 08/07/2021 12:37:03 AM      PATIENT REFERRED TO:    Pt to return to [address] [place] [address].  Need to continue to monitor his swelling, his urinary output daily. Need to continue with IV antibiotics and continue with his Lasix but increase morning dose to 80 mg and 40 mg at night. Pt to return if fever develops or SOA          DISCHARGE MEDICATIONS:  New Prescriptions    FUROSEMIDE (LASIX) 40 MG TABLET    Take 2 tabs on morning and 1 tab at night for next 4-5 days.        (Please note that portions of this note were completed with a voice recognition program.  Efforts were made to edit the dictations but occasionallywords are mis-transcribed.)    Haley Hunt DO (electronically signed)  Attending Emergency Physician          Haley Hunt DO  08/07/21 8964

## 2021-08-07 VITALS
TEMPERATURE: 99.2 F | BODY MASS INDEX: 20.31 KG/M2 | HEART RATE: 102 BPM | HEIGHT: 68 IN | RESPIRATION RATE: 18 BRPM | OXYGEN SATURATION: 98 % | SYSTOLIC BLOOD PRESSURE: 138 MMHG | DIASTOLIC BLOOD PRESSURE: 101 MMHG | WEIGHT: 134 LBS

## 2021-08-07 RX ORDER — FUROSEMIDE 40 MG/1
TABLET ORAL
Qty: 15 TABLET | Refills: 0 | Status: SHIPPED | OUTPATIENT
Start: 2021-08-07

## 2021-08-08 ENCOUNTER — HOSPITAL ENCOUNTER (EMERGENCY)
Facility: HOSPITAL | Age: 36
Discharge: ANOTHER HOSPITAL | End: 2021-08-08
Attending: FAMILY MEDICINE
Payer: COMMERCIAL

## 2021-08-08 ENCOUNTER — APPOINTMENT (OUTPATIENT)
Dept: CT IMAGING | Facility: HOSPITAL | Age: 36
End: 2021-08-08
Payer: COMMERCIAL

## 2021-08-08 VITALS
HEIGHT: 68 IN | HEART RATE: 82 BPM | TEMPERATURE: 98.1 F | BODY MASS INDEX: 20.31 KG/M2 | RESPIRATION RATE: 14 BRPM | OXYGEN SATURATION: 95 % | WEIGHT: 134 LBS | SYSTOLIC BLOOD PRESSURE: 133 MMHG | DIASTOLIC BLOOD PRESSURE: 110 MMHG

## 2021-08-08 DIAGNOSIS — R10.9 ABDOMINAL PAIN, UNSPECIFIED ABDOMINAL LOCATION: ICD-10-CM

## 2021-08-08 DIAGNOSIS — R60.1 ANASARCA: ICD-10-CM

## 2021-08-08 DIAGNOSIS — K92.2 ACUTE LOWER GI BLEEDING: Primary | ICD-10-CM

## 2021-08-08 DIAGNOSIS — D64.9 ANEMIA, UNSPECIFIED TYPE: ICD-10-CM

## 2021-08-08 LAB
ABO/RH: NORMAL
ANION GAP SERPL CALCULATED.3IONS-SCNC: 9 MMOL/L (ref 3–16)
ANTIBODY SCREEN: NORMAL
BASOPHILS ABSOLUTE: 0.1 K/UL (ref 0–0.1)
BASOPHILS RELATIVE PERCENT: 1.8 %
BLOOD BANK DISPENSE STATUS: NORMAL
BLOOD BANK PRODUCT CODE: NORMAL
BPU ID: NORMAL
BUN BLDV-MCNC: 15 MG/DL (ref 6–20)
CALCIUM SERPL-MCNC: 8.1 MG/DL (ref 8.5–10.5)
CHLORIDE BLD-SCNC: 105 MMOL/L (ref 98–107)
CO2: 22 MMOL/L (ref 20–30)
COMPONENT: NORMAL
CREAT SERPL-MCNC: 1.2 MG/DL (ref 0.4–1.2)
DONOR TYPE/RH: NORMAL
EOSINOPHILS ABSOLUTE: 0.2 K/UL (ref 0–0.4)
EOSINOPHILS RELATIVE PERCENT: 2.7 %
GFR AFRICAN AMERICAN: >59
GFR NON-AFRICAN AMERICAN: >59
GLUCOSE BLD-MCNC: 91 MG/DL (ref 74–106)
HCT VFR BLD CALC: 24.1 % (ref 40–54)
HEMOGLOBIN: 7.8 G/DL (ref 13–18)
HOLLISTER NO: NORMAL
IMMATURE GRANULOCYTES #: 0 K/UL
IMMATURE GRANULOCYTES %: 0.4 % (ref 0–5)
LYMPHOCYTES ABSOLUTE: 1.8 K/UL (ref 1.5–4)
LYMPHOCYTES RELATIVE PERCENT: 25.4 %
MAGNESIUM: 1.8 MG/DL (ref 1.7–2.4)
MCH RBC QN AUTO: 27.5 PG (ref 27–32)
MCHC RBC AUTO-ENTMCNC: 32.4 G/DL (ref 31–35)
MCV RBC AUTO: 84.9 FL (ref 80–100)
MONOCYTES ABSOLUTE: 0.5 K/UL (ref 0.2–0.8)
MONOCYTES RELATIVE PERCENT: 6.9 %
NEUTROPHILS ABSOLUTE: 4.5 K/UL (ref 2–7.5)
NEUTROPHILS RELATIVE PERCENT: 62.8 %
OCCULT BLOOD DIAGNOSTIC: ABNORMAL
PDW BLD-RTO: 15.4 % (ref 11–16)
PLATELET # BLD: 795 K/UL (ref 150–400)
PMV BLD AUTO: 8.7 FL (ref 6–10)
POTASSIUM REFLEX MAGNESIUM: 2.8 MMOL/L (ref 3.4–5.1)
PRO-BNP: 2809 PG/ML (ref 0–900)
RBC # BLD: 2.84 M/UL (ref 4.5–6)
SODIUM BLD-SCNC: 136 MMOL/L (ref 136–145)
WBC # BLD: 7.1 K/UL (ref 4–11)

## 2021-08-08 PROCEDURE — 36430 TRANSFUSION BLD/BLD COMPNT: CPT

## 2021-08-08 PROCEDURE — 86850 RBC ANTIBODY SCREEN: CPT

## 2021-08-08 PROCEDURE — 6360000002 HC RX W HCPCS: Performed by: FAMILY MEDICINE

## 2021-08-08 PROCEDURE — P9016 RBC LEUKOCYTES REDUCED: HCPCS

## 2021-08-08 PROCEDURE — 6360000004 HC RX CONTRAST MEDICATION: Performed by: FAMILY MEDICINE

## 2021-08-08 PROCEDURE — 6360000002 HC RX W HCPCS

## 2021-08-08 PROCEDURE — 74177 CT ABD & PELVIS W/CONTRAST: CPT

## 2021-08-08 PROCEDURE — 86901 BLOOD TYPING SEROLOGIC RH(D): CPT

## 2021-08-08 PROCEDURE — 83880 ASSAY OF NATRIURETIC PEPTIDE: CPT

## 2021-08-08 PROCEDURE — 86900 BLOOD TYPING SEROLOGIC ABO: CPT

## 2021-08-08 PROCEDURE — 86920 COMPATIBILITY TEST SPIN: CPT

## 2021-08-08 PROCEDURE — 85025 COMPLETE CBC W/AUTO DIFF WBC: CPT

## 2021-08-08 PROCEDURE — 80048 BASIC METABOLIC PNL TOTAL CA: CPT

## 2021-08-08 PROCEDURE — 6370000000 HC RX 637 (ALT 250 FOR IP)

## 2021-08-08 PROCEDURE — G0328 FECAL BLOOD SCRN IMMUNOASSAY: HCPCS

## 2021-08-08 PROCEDURE — 83735 ASSAY OF MAGNESIUM: CPT

## 2021-08-08 PROCEDURE — 36415 COLL VENOUS BLD VENIPUNCTURE: CPT

## 2021-08-08 PROCEDURE — C9113 INJ PANTOPRAZOLE SODIUM, VIA: HCPCS

## 2021-08-08 RX ORDER — MORPHINE SULFATE 4 MG/ML
4 INJECTION, SOLUTION INTRAMUSCULAR; INTRAVENOUS ONCE
Status: DISCONTINUED | OUTPATIENT
Start: 2021-08-08 | End: 2021-08-08 | Stop reason: HOSPADM

## 2021-08-08 RX ORDER — SODIUM CHLORIDE 9 MG/ML
10 INJECTION INTRAVENOUS DAILY
Status: DISCONTINUED | OUTPATIENT
Start: 2021-08-08 | End: 2021-08-08 | Stop reason: HOSPADM

## 2021-08-08 RX ORDER — SODIUM CHLORIDE 9 MG/ML
INJECTION, SOLUTION INTRAVENOUS PRN
Status: DISCONTINUED | OUTPATIENT
Start: 2021-08-08 | End: 2021-08-08 | Stop reason: HOSPADM

## 2021-08-08 RX ORDER — DIPHENHYDRAMINE HCL 25 MG
CAPSULE ORAL
Status: COMPLETED
Start: 2021-08-08 | End: 2021-08-08

## 2021-08-08 RX ORDER — PANTOPRAZOLE SODIUM 40 MG/10ML
40 INJECTION, POWDER, LYOPHILIZED, FOR SOLUTION INTRAVENOUS DAILY
Status: DISCONTINUED | OUTPATIENT
Start: 2021-08-08 | End: 2021-08-08 | Stop reason: HOSPADM

## 2021-08-08 RX ORDER — DIPHENHYDRAMINE HCL 25 MG
25 CAPSULE ORAL ONCE
Status: COMPLETED | OUTPATIENT
Start: 2021-08-08 | End: 2021-08-08

## 2021-08-08 RX ORDER — MORPHINE SULFATE 4 MG/ML
4 INJECTION, SOLUTION INTRAMUSCULAR; INTRAVENOUS ONCE
Status: COMPLETED | OUTPATIENT
Start: 2021-08-08 | End: 2021-08-08

## 2021-08-08 RX ORDER — POTASSIUM CHLORIDE 7.45 MG/ML
10 INJECTION INTRAVENOUS ONCE
Status: COMPLETED | OUTPATIENT
Start: 2021-08-08 | End: 2021-08-08

## 2021-08-08 RX ORDER — ACETAMINOPHEN 325 MG/1
TABLET ORAL
Status: COMPLETED
Start: 2021-08-08 | End: 2021-08-08

## 2021-08-08 RX ORDER — MAGNESIUM SULFATE IN WATER 40 MG/ML
2000 INJECTION, SOLUTION INTRAVENOUS ONCE
Status: DISCONTINUED | OUTPATIENT
Start: 2021-08-08 | End: 2021-08-08

## 2021-08-08 RX ORDER — PANTOPRAZOLE SODIUM 40 MG/10ML
INJECTION, POWDER, LYOPHILIZED, FOR SOLUTION INTRAVENOUS
Status: COMPLETED
Start: 2021-08-08 | End: 2021-08-08

## 2021-08-08 RX ORDER — ONDANSETRON 2 MG/ML
4 INJECTION INTRAMUSCULAR; INTRAVENOUS ONCE
Status: COMPLETED | OUTPATIENT
Start: 2021-08-08 | End: 2021-08-08

## 2021-08-08 RX ORDER — ACETAMINOPHEN 325 MG/1
650 TABLET ORAL ONCE
Status: COMPLETED | OUTPATIENT
Start: 2021-08-08 | End: 2021-08-08

## 2021-08-08 RX ORDER — POTASSIUM CHLORIDE 7.45 MG/ML
INJECTION INTRAVENOUS
Status: COMPLETED
Start: 2021-08-08 | End: 2021-08-08

## 2021-08-08 RX ORDER — POTASSIUM CHLORIDE 20 MEQ/1
40 TABLET, EXTENDED RELEASE ORAL ONCE
Status: DISCONTINUED | OUTPATIENT
Start: 2021-08-08 | End: 2021-08-08

## 2021-08-08 RX ADMIN — Medication 25 MG: at 07:11

## 2021-08-08 RX ADMIN — PANTOPRAZOLE SODIUM 40 MG: 40 INJECTION, POWDER, LYOPHILIZED, FOR SOLUTION INTRAVENOUS at 04:12

## 2021-08-08 RX ADMIN — ACETAMINOPHEN 650 MG: 325 TABLET, FILM COATED ORAL at 07:11

## 2021-08-08 RX ADMIN — POTASSIUM CHLORIDE 10 MEQ: 7.45 INJECTION INTRAVENOUS at 05:31

## 2021-08-08 RX ADMIN — IOPAMIDOL 100 ML: 755 INJECTION, SOLUTION INTRAVENOUS at 04:30

## 2021-08-08 RX ADMIN — DIPHENHYDRAMINE HYDROCHLORIDE 25 MG: 25 CAPSULE ORAL at 07:11

## 2021-08-08 RX ADMIN — ACETAMINOPHEN 650 MG: 325 TABLET ORAL at 07:11

## 2021-08-08 RX ADMIN — ONDANSETRON 4 MG: 2 INJECTION INTRAMUSCULAR; INTRAVENOUS at 01:52

## 2021-08-08 RX ADMIN — MORPHINE SULFATE 4 MG: 4 INJECTION, SOLUTION INTRAMUSCULAR; INTRAVENOUS at 01:52

## 2021-08-08 RX ADMIN — POTASSIUM CHLORIDE 10 MEQ: 10 INJECTION, SOLUTION INTRAVENOUS at 05:31

## 2021-08-08 ASSESSMENT — ENCOUNTER SYMPTOMS
BLOOD IN STOOL: 1
ABDOMINAL PAIN: 1
COLOR CHANGE: 1

## 2021-08-08 ASSESSMENT — PAIN SCALES - GENERAL
PAINLEVEL_OUTOF10: 10

## 2021-08-08 ASSESSMENT — PAIN DESCRIPTION - LOCATION: LOCATION: BACK;LEG;FOOT

## 2021-08-08 ASSESSMENT — PAIN DESCRIPTION - PAIN TYPE: TYPE: ACUTE PAIN

## 2021-08-08 NOTE — FLOWSHEET NOTE
08/08/21 0731   Vitals   Temp 97.9 °F (36.6 °C)   Temp Source Oral   Pulse 91   Resp 16   SpO2 99 %     Patient tolerating infusion well. No s/s of adverse reaction noted.

## 2021-08-08 NOTE — ED TRIAGE NOTES
Pt here via [facility] with guards at bedside. Pt c/o swelling over body, leg and feet pain and low back pain.  Pt recently dx with endocarditis and was at ed last dater for same complaint

## 2021-08-08 NOTE — ED NOTES
Pt resting quietly with eyes closed, morphine not given at this time.       Yanet Sanchez RN  08/08/21 0620

## 2021-08-08 NOTE — ED NOTES
Pt has removed bp cuff and pulse ox. Pt alert and oriented. Pale and warm. Pt has [id number] ml pale yellow urine.       Marianne Erickson RN  08/08/21 7135

## 2021-08-08 NOTE — ED NOTES
Patient report to [facility] at this time.      Ramy Zheng RN  08/08/21 4760

## 2021-08-08 NOTE — ED NOTES
Patient en route to [facility] / [facility] with [facility].      Christiano Hand RN  08/08/21 5341

## 2021-08-08 NOTE — ED NOTES
pts potassium paused, picc flushed. Then flushed, pulled and wasted 10 ml. Labs collected for type and cross.  Pt kelley well and ask for drink which was provided     Tj Milan RN  08/08/21 2431

## 2021-08-08 NOTE — ED PROVIDER NOTES
[address]  eMERGENCY dEPARTMENT eNCOUnter      Pt Name: Eduardo Rock  MRN: [id number]  [place] 1985  Date of evaluation: 8/7/2021  Provider: Carmine Yanes DO    CHIEF COMPLAINT       Chief Complaint   Patient presents with    Abdominal Pain    Leg Swelling    Rectal Bleeding         HISTORY OF PRESENT ILLNESS   (Location/Symptom, Timing/Onset, Context/Setting, Quality, Duration, Modifying Factors, Severity)  Note limiting factors. Eduardo Rock is a 37 y.o. male who presents to the emergency department for having continued swelling, having some generalized abdominal discomfort, and having rectal bleeding. Pt with recent history of having bacterial endocarditis, septic emboli to pulmonary and splenic symptom. Pt while there received AngioVac, had PICC line placed and given Nafcillin. Pt states that he continued to have swelling while there and they said that it was too much fluids he was getting. Pt without history of CHF, isn't having any shortness of breath, no chest pain, no cough, no diarrhea. No urinary symptoms. Pt isn't on any blood thinners but said they were giving him some shots in his abdomen. Pt complaining of pain all over his body, lopez to his back, buttocks, legs and feet. Pt without current fever. No current nausea or vomiting. No hematemesis. No headache. No dizziness or near syncope. Pt does feel weak all over and has been having to get up in his medical cell with a walker. Pt has been urinating well and taking his lasix 80 mg this morning and 40 mg tonight before arrival. Pt without history of GI bleeding. No recent rectal bleeding. No history of PUD, no varices history. Pt said that he had 2 bowel movements yesterday with blood with stool, tonight before arrival he had \"pure blood that came out\". No history of bleeding hemorrhoids. Nursing Notes were reviewed.     REVIEW OF SYSTEMS    (2-9 systems for level 4, 10 or more forlevel 5)     Review of Systems   Constitutional: Positive for activity change and fatigue. Gastrointestinal: Positive for abdominal pain and blood in stool. Musculoskeletal: Positive for arthralgias. Skin: Positive for color change. All other systems reviewed and are negative. PAST MEDICAL HISTORY     Past Medical History:   Diagnosis Date    Endocarditis     Hepatitis C          SURGICAL HISTORY     History reviewed. No pertinent surgical history. CURRENT MEDICATIONS       Previous Medications    FUROSEMIDE (LASIX) 40 MG TABLET    Take 2 tabs on morning and 1 tab at night for next 4-5 days. ALLERGIES     Trileptal [oxcarbazepine]    FAMILY HISTORY     History reviewed. No pertinent family history. SOCIAL HISTORY       Social History     Socioeconomic History    Marital status: Unknown     Spouse name: None    Number of children: None    Years of education: None    Highest education level: None   Occupational History    None   Tobacco Use    Smoking status: Former Smoker    Smokeless tobacco: Never Used   Substance and Sexual Activity    Alcohol use: Not Currently    Drug use: None    Sexual activity: None   Other Topics Concern    None   Social History Narrative    None     Social Determinants of Health     Financial Resource Strain:     Difficulty of Paying Living Expenses:    Food Insecurity:     Worried About Running Out of Food in the Last Year:     Ran Out of Food in the Last Year:    Transportation Needs:     Lack of Transportation (Medical):      Lack of Transportation (Non-Medical):    Physical Activity:     Days of Exercise per Week:     Minutes of Exercise per Session:    Stress:     Feeling of Stress :    Social Connections:     Frequency of Communication with Friends and Family:     Frequency of Social Gatherings with Friends and Family:     Attends [employer] Services:     Active Member of Clubs or Organizations:     Attends Club or Organization Meetings:    [facility] Marital Status:    Intimate Partner Violence:     Fear of Current or Ex-Partner:     Emotionally Abused:     Physically Abused:     Sexually Abused:        SCREENINGS             PHYSICAL EXAM    (up to 7 for level 4, 8 or more for level 5)     ED Triage Vitals [08/08/21 0017]   BP Temp Temp Source Pulse Resp SpO2 Height Weight   (!) 136/105 98.2 °F (36.8 °C) Oral 98 18 98 % 5' 8\" (1.727 m) 134 lb (60.8 kg)       Physical Exam  Vitals and nursing note reviewed. Constitutional:       General: He is not in acute distress. Appearance: He is well-developed. He is ill-appearing. HENT:      Head: Normocephalic. Eyes:      Extraocular Movements: Extraocular movements intact. Pupils: Pupils are equal, round, and reactive to light. Comments: Pale conjunctiva bilaterally   Cardiovascular:      Rate and Rhythm: Normal rate and regular rhythm. Heart sounds: Normal heart sounds. No murmur heard. Pulmonary:      Effort: Pulmonary effort is normal.      Comments: Mild bilateral posterior basilar rales R>L  Abdominal:      General: Bowel sounds are normal.      Palpations: Abdomen is soft. Tenderness: There is generalized abdominal tenderness. Genitourinary:     Rectum: Guaiac result positive. Comments: Pt with marked edema to scrotum and penis  Skin:     General: Skin is warm and dry. Coloration: Skin is pale. Comments: Pt has +3 pitting edema from lower extremities up to just below umbilicus. Pt's arm edema + 2; Pt does have anasarca noted but seems to be improved at this time from yesterday   Neurological:      Mental Status: He is alert and oriented to person, place, and time. Psychiatric:         Mood and Affect: Mood is depressed.          Behavior: Behavior normal.         DIAGNOSTIC RESULTS     EKG: All EKG's are interpreted by the Emergency Department Physician who either signs or Co-signs this chart in the absence of a cardiologist.    None    RADIOLOGY:   Non-plain film images such as CT, Ultrasound and MRI are read by the radiologist. Plain radiographic images are visualized andpreliminarily interpreted by the emergency physician with the below findings:    CT abd/pelvis w/IV contrast - See Below    Interpretationper the Radiologist below, if available at the time of this note:    CT ABDOMEN PELVIS W IV CONTRAST Additional Contrast? None    (Results Pending)         ED BEDSIDE ULTRASOUND:   Performed by ED Physician - none    LABS:  Labs Reviewed   CBC WITH AUTO DIFFERENTIAL - Abnormal; Notable for the following components:       Result Value    RBC 2.84 (*)     Hemoglobin 7.8 (*)     Hematocrit 24.1 (*)     Platelets 352 (*)     All other components within normal limits    Narrative:     Performed at:  [address] [place] [address] Laboratory  [address] [place] [address],  [place], [place] [address]   Phone [phone]   BASIC METABOLIC PANEL W/ REFLEX TO MG FOR LOW K - Abnormal; Notable for the following components:    Potassium reflex Magnesium 2.8 (*)     Calcium 8.1 (*)     All other components within normal limits    Narrative:     Godwin Lopez tel. [phone],  Chemistry results called to and read back by Judy Holland, 08/08/2021  02:31, by [facility]  Performed at:  [address] [place] [address] Laboratory  [address] [place] [address]  [place], [place] [address]   Phone [phone]   BRAIN NATRIURETIC PEPTIDE - Abnormal; Notable for the following components:    Pro-BNP 2,809 (*)     All other components within normal limits    Narrative:     Performed at:  [address] [place] [address] Laboratory  [address] [place] [address],  [place], [place] [address] [address]   Phone [phone]   BLOOD OCCULT STOOL DIAGNOSTIC - Abnormal; Notable for the following components:    Occult Blood Diagnostic   (*)     Value: Result: Negative  Normal range: Negative  Result: POSITIVE  Normal range: Negative      All other components within normal limits    Narrative:     ORDER#: [id number]                          ORDERED BY: Amadou Douglas  SOURCE: Stool                              COLLECTED:  08/08/21 03:00  ANTIBIOTICS AT [facility]A.:                      RECEIVED :  08/08/21 03:07  Performed at:  [address] [place] [address] and [facility]  [address] [place] [address],  [place], [place] [address] [address]   Phone [phone]   MAGNESIUM    Narrative:     Tara Reina tel. [phone],  Chemistry results called to and read back by Corbin Tineo, 08/08/2021  02:31, by [facility]  Performed at:  [address] [place] [address] and [address],  [place], [place] [address] [address]   Phone [phone]   TYPE AND SCREEN   PREPARE RBC (CROSSMATCH)       All other labs were within normal range or not returned as of this dictation. EMERGENCY DEPARTMENT COURSE and DIFFERENTIAL DIAGNOSIS/MDM:   Vitals:    Vitals:    08/08/21 0215 08/08/21 0230 08/08/21 0245 08/08/21 0300   BP: (!) 143/110 (!) 143/108 (!) 143/114 (!) 136/99   Pulse:       Resp:       Temp:       TempSrc:       SpO2:       Weight:       Height:               CRITICAL CARE TIME   Total Critical Care time was 0 minutes, excluding separatelyreportable procedures. There was a high probability ofclinically significant/life threatening deterioration in the patient's condition which required my urgent intervention. CONSULTS:  Harris Diaz to speak with Dr. Kelsi Ly at transfer line. Discussed his history. Recent anasarca that I feel has somewhat improved. Pt is having bright red blood in rectum and with exam concerning for lower GI bleed; He put him on waiting list and recommended CT; I told him I was treating his potassium, Hgb dropped from 8.3 to 7.8. He then called back and stated that he had a bed for patient and that if we could start blood it would help. Still awaiting CT results.  Gave IV Protonix for any upper GI bleed problems     PROCEDURES:  None    PROGRESS NOTES:    Will obtain BMP, BNP, CBC to assess Hgb, kidney function, and fluid overload. Will get rectal exam/hemoccult to assess for GI bleed; Ordered IV potassium; Pt with blood in rectal vault without stool, No gross hemorrhaging of blood from anus. Pt with Hgb dropping to 7.8 will type and cross for 1 unit blood since he is pale, symptomatic and could have GI bleed. CT abd/pelvis pending. Will transfer to [facility] for further assessment and possible EGD/Colonoscopy    FINAL IMPRESSION      1. Acute lower GI bleeding New Problem   2. Abdominal pain, unspecified abdominal location New Problem   3. Anasarca New Problem   4. Anemia, unspecified type New Problem         DISPOSITION/PLAN   DISPOSITION Decision To Transfer 08/08/2021 05:05:26 AM      PATIENT REFERRED TO:  No follow-up provider specified.     DISCHARGE MEDICATIONS:  New Prescriptions    No medications on file       (Please note that portions of this note were completed with a voice recognition program.  Efforts were made to edit the dictations but occasionallywords are mis-transcribed.)    Emilie Pimentel DO (electronically signed)  Attending Emergency Physician          Emilie Pimentel DO  08/08/21 5734

## 2021-08-08 NOTE — ED NOTES
Blood transfusion started at this time. Patient premedicated with tylenol and benadryl and consent obtained by Avi Weinstein RN. Guard x 2 at bedside. Will remain with patient x 15 mins to monitor for adverse reaction.       Noah Weston RN  08/08/21 7414

## 2021-08-08 NOTE — ED NOTES
Administered Isovue 370 per radiology department protocols via [address] established prior to arrival.  Injected at a rate of 2.5 ml/sec.  Pt tolerated the exam without any obvious difficulties and was d/c'd back to ER with no apparent reaction to the contrast.  ER RN notified of contrast administration